# Patient Record
Sex: FEMALE | Race: WHITE | NOT HISPANIC OR LATINO | Employment: FULL TIME | URBAN - METROPOLITAN AREA
[De-identification: names, ages, dates, MRNs, and addresses within clinical notes are randomized per-mention and may not be internally consistent; named-entity substitution may affect disease eponyms.]

---

## 2017-02-06 PROBLEM — G89.29 CHRONIC BILATERAL LOW BACK PAIN WITHOUT SCIATICA: Status: ACTIVE | Noted: 2017-02-06

## 2018-08-28 PROBLEM — Q78.0 OSTEOGENESIS IMPERFECTA TYPE I: Chronic | Status: ACTIVE | Noted: 2017-02-05

## 2018-08-29 PROBLEM — S32.10XA FX SACRUM/COCCYX-CLOSED: Status: ACTIVE | Noted: 2018-08-28

## 2019-12-21 PROBLEM — Z34.90 NORMAL INTRAUTERINE PREGNANCY, ANTEPARTUM: Status: ACTIVE | Noted: 2019-12-21

## 2019-12-21 PROBLEM — O34.219 HISTORY OF CESAREAN DELIVERY, CURRENTLY PREGNANT: Status: ACTIVE | Noted: 2019-12-21

## 2020-03-17 PROBLEM — R93.1 ABNORMAL ECHOCARDIOGRAM: Status: ACTIVE | Noted: 2020-03-17

## 2021-06-03 PROBLEM — M54.50 CHRONIC BILATERAL LOW BACK PAIN WITHOUT SCIATICA: Chronic | Status: RESOLVED | Noted: 2017-02-06 | Resolved: 2021-06-03

## 2022-07-25 PROBLEM — N93.9 ABNORMAL UTERINE BLEEDING: Status: ACTIVE | Noted: 2022-07-25

## 2024-01-04 ENCOUNTER — OFFICE VISIT (OUTPATIENT)
Dept: URGENT CARE | Facility: CLINIC | Age: 39
End: 2024-01-04
Payer: COMMERCIAL

## 2024-01-04 VITALS
RESPIRATION RATE: 18 BRPM | DIASTOLIC BLOOD PRESSURE: 56 MMHG | TEMPERATURE: 98 F | OXYGEN SATURATION: 99 % | WEIGHT: 134 LBS | HEIGHT: 65 IN | SYSTOLIC BLOOD PRESSURE: 96 MMHG | BODY MASS INDEX: 22.33 KG/M2 | HEART RATE: 98 BPM

## 2024-01-04 DIAGNOSIS — R35.0 URINARY FREQUENCY: Primary | ICD-10-CM

## 2024-01-04 DIAGNOSIS — R30.0 DYSURIA: ICD-10-CM

## 2024-01-04 DIAGNOSIS — H60.501 ACUTE OTITIS EXTERNA OF RIGHT EAR, UNSPECIFIED TYPE: ICD-10-CM

## 2024-01-04 DIAGNOSIS — H66.001 NON-RECURRENT ACUTE SUPPURATIVE OTITIS MEDIA OF RIGHT EAR WITHOUT SPONTANEOUS RUPTURE OF TYMPANIC MEMBRANE: ICD-10-CM

## 2024-01-04 DIAGNOSIS — N30.01 ACUTE CYSTITIS WITH HEMATURIA: ICD-10-CM

## 2024-01-04 LAB
BILIRUB UR QL STRIP: POSITIVE
GLUCOSE UR QL STRIP: POSITIVE
KETONES UR QL STRIP: NEGATIVE
LEUKOCYTE ESTERASE UR QL STRIP: NEGATIVE
PH, POC UA: 5.5
POC BLOOD, URINE: NEGATIVE
POC NITRATES, URINE: POSITIVE
PROT UR QL STRIP: POSITIVE
SP GR UR STRIP: 1.02 (ref 1–1.03)
UROBILINOGEN UR STRIP-ACNC: 8 (ref 0.1–1.1)

## 2024-01-04 PROCEDURE — 81003 URINALYSIS AUTO W/O SCOPE: CPT | Mod: QW,S$GLB,, | Performed by: PHYSICIAN ASSISTANT

## 2024-01-04 PROCEDURE — 99214 OFFICE O/P EST MOD 30 MIN: CPT | Mod: S$GLB,,, | Performed by: PHYSICIAN ASSISTANT

## 2024-01-04 PROCEDURE — 87086 URINE CULTURE/COLONY COUNT: CPT | Performed by: PHYSICIAN ASSISTANT

## 2024-01-04 RX ORDER — CIPROFLOXACIN AND DEXAMETHASONE 3; 1 MG/ML; MG/ML
4 SUSPENSION/ DROPS AURICULAR (OTIC) 2 TIMES DAILY
Qty: 7.5 ML | Refills: 0 | Status: SHIPPED | OUTPATIENT
Start: 2024-01-04

## 2024-01-04 RX ORDER — AMOXICILLIN AND CLAVULANATE POTASSIUM 875; 125 MG/1; MG/1
1 TABLET, FILM COATED ORAL 2 TIMES DAILY
Qty: 20 TABLET | Refills: 0 | Status: SHIPPED | OUTPATIENT
Start: 2024-01-04 | End: 2024-01-14

## 2024-01-04 NOTE — LETTER
January 4, 2024      [facility]  [address], [address]  [place] [address]  Phone: [phone]       Patient: Asya Lora   YOB: 1985  Date of Visit: 01/04/2024    To Whom It May Concern:    Frances Lora  was at [facility] on 01/04/2024. The patient may return to work/school on 1/5/2024 with no restrictions. If you have any questions or concerns, or if I can be of further assistance, please do not hesitate to contact me.    Sincerely,      Jose Luis Watt PA-C

## 2024-01-04 NOTE — PROGRESS NOTES
"Subjective:      Patient ID: Asya Lora is a 38 y.o. female.    Vitals:  height is 5' 5" (1.651 m) and weight is 60.8 kg (134 lb). Her oral temperature is 98 °F (36.7 °C). Her blood pressure is 96/56 (abnormal) and her pulse is 98. Her respiration is 18 and oxygen saturation is 99%.     Chief Complaint: Urinary Frequency    Pt states she had Pyelonephritis, finished cipro 2 days ago and still having symptoms. She is experiencing dysuria, suprapubic pressure and urinary frequency.  States flank pain and significant hematuria has improved.  Pt also c/o right ear pain with drainage x 1 day ago, pt also c/o sinus congestion x 5 days.  No fevers, chills, or N/V.   Currently taking Azos for UTI symptoms.    Urinary Frequency   This is a recurrent problem. The current episode started in the past 7 days. The problem has been gradually worsening. The quality of the pain is described as burning and aching. There has been no fever. Associated symptoms include frequency and urgency. Pertinent negatives include no behavior changes, chills, discharge, flank pain, hematuria, hesitancy, nausea, sweats, vomiting, weight loss, bubble bath use, constipation, rash or withholding. She has tried antibiotics for the symptoms. The treatment provided mild relief. Her past medical history is significant for recurrent UTIs. There is no history of catheterization, diabetes insipidus, diabetes mellitus, genitourinary reflux, hypertension, kidney stones, a single kidney, STD, urinary stasis or a urological procedure.       Constitution: Negative for chills.   HENT:  Positive for ear pain, ear discharge, sinus pain and sinus pressure.    Gastrointestinal:  Negative for nausea, vomiting and constipation.   Genitourinary:  Positive for dysuria, frequency and urgency. Negative for flank pain and hematuria.   Skin:  Negative for rash.      Objective:     Physical Exam   Constitutional: She is oriented to person, place, and time. She appears " well-developed.   HENT:   Head: Normocephalic and atraumatic.   Ears:   Right Ear: External ear normal. There is drainage, swelling and tenderness. Tympanic membrane is bulging.   Left Ear: External ear normal.   Nose: Nose normal.   Mouth/Throat: Oropharynx is clear and moist. Mucous membranes are moist.   Eyes: Conjunctivae and EOM are normal. Pupils are equal, round, and reactive to light.   Neck: Neck supple.   Cardiovascular: Normal rate, regular rhythm, normal heart sounds and normal pulses.   Pulmonary/Chest: Effort normal and breath sounds normal.   Abdominal: She exhibits no distension. Soft. flat abdomen There is no left CVA tenderness and no right CVA tenderness.   Musculoskeletal: Normal range of motion.         General: Normal range of motion.   Neurological: She is alert and oriented to person, place, and time.   Skin: Skin is warm and dry.   Vitals reviewed.      Assessment:     1. Urinary frequency    2. Dysuria    3. Non-recurrent acute suppurative otitis media of right ear without spontaneous rupture of tympanic membrane    4. Acute otitis externa of right ear, unspecified type    5. Acute cystitis with hematuria        Plan:       Urinary frequency  -     Cancel: Urinalysis, Reflex to Urine Culture Urine, Clean Catch  -     Urine culture  -     POCT Urinalysis, Dipstick, Automated, W/O Scope    Dysuria  -     Urine culture  -     POCT Urinalysis, Dipstick, Automated, W/O Scope    Non-recurrent acute suppurative otitis media of right ear without spontaneous rupture of tympanic membrane  -     amoxicillin-clavulanate 875-125mg (AUGMENTIN) 875-125 mg per tablet; Take 1 tablet by mouth 2 (two) times daily. for 10 days  Dispense: 20 tablet; Refill: 0    Acute otitis externa of right ear, unspecified type  -     ciprofloxacin-dexAMETHasone 0.3-0.1% (CIPRODEX) 0.3-0.1 % DrpS; Place 4 drops into both ears 2 (two) times daily.  Dispense: 7.5 mL; Refill: 0    Acute cystitis with hematuria      Results for  orders placed or performed in visit on 01/04/24   POCT Urinalysis, Dipstick, Automated, W/O Scope   Result Value Ref Range    POC Blood, Urine Negative Negative, Positive Slide, Positive Tube    POC Bilirubin, Urine Positive (A) Negative, Positive Slide, Positive Tube    POC Urobilinogen, Urine 8.0 (A) 0.1 - 1.1    POC Ketones, Urine Negative Negative, Positive Slide, Positive Tube    POC Protein, Urine Positive (A) Negative, Positive Slide, Positive Tube    POC Nitrates, Urine Positive (A) Negative, Positive Slide, Positive Tube    POC Glucose, Urine Positive (A) Negative, Positive Slide, Positive Tube    pH, UA 5.5     POC Specific Gravity, Urine 1.025 1.003 - 1.029    POC Leukocytes, Urine Negative Negative, Positive Slide, Positive Tube              1. Medications:  Augmentin to cover ear infection and UTI.  We will call her with results of urine culture once available.  2. Maintain adequate hydration  3. Follow up with Primary Care physician in 3-5 days.  4.  Report to Emergency Department if symptoms worsen or change.

## 2024-01-05 ENCOUNTER — PATIENT MESSAGE (OUTPATIENT)
Dept: INTERNAL MEDICINE | Facility: CLINIC | Age: 39
End: 2024-01-05
Payer: COMMERCIAL

## 2024-01-05 LAB — BACTERIA UR CULT: NORMAL

## 2024-01-08 ENCOUNTER — OFFICE VISIT (OUTPATIENT)
Dept: INTERNAL MEDICINE | Facility: CLINIC | Age: 39
End: 2024-01-08
Payer: COMMERCIAL

## 2024-01-08 VITALS
BODY MASS INDEX: 22.19 KG/M2 | HEART RATE: 97 BPM | DIASTOLIC BLOOD PRESSURE: 60 MMHG | SYSTOLIC BLOOD PRESSURE: 102 MMHG | WEIGHT: 133.19 LBS | OXYGEN SATURATION: 97 % | HEIGHT: 65 IN | TEMPERATURE: 98 F

## 2024-01-08 DIAGNOSIS — F33.1 MODERATE EPISODE OF RECURRENT MAJOR DEPRESSIVE DISORDER: ICD-10-CM

## 2024-01-08 DIAGNOSIS — H69.91 DYSFUNCTION OF RIGHT EUSTACHIAN TUBE: Primary | ICD-10-CM

## 2024-01-08 DIAGNOSIS — N39.0 URINARY TRACT INFECTION WITHOUT HEMATURIA, SITE UNSPECIFIED: ICD-10-CM

## 2024-01-08 LAB
BILIRUB SERPL-MCNC: NORMAL MG/DL
BLOOD URINE, POC: NORMAL
CLARITY, POC UA: CLEAR
COLOR, POC UA: YELLOW
GLUCOSE UR QL STRIP: NORMAL
KETONES UR QL STRIP: NORMAL
LEUKOCYTE ESTERASE URINE, POC: NORMAL
NITRITE, POC UA: NORMAL
PH, POC UA: 5
PROTEIN, POC: NORMAL
SPECIFIC GRAVITY, POC UA: 1
UROBILINOGEN, POC UA: 0.2

## 2024-01-08 PROCEDURE — 99999 PR PBB SHADOW E&M-EST. PATIENT-LVL IV: CPT | Mod: PBBFAC,,, | Performed by: NURSE PRACTITIONER

## 2024-01-08 PROCEDURE — 81002 URINALYSIS NONAUTO W/O SCOPE: CPT | Mod: S$GLB,,, | Performed by: NURSE PRACTITIONER

## 2024-01-08 PROCEDURE — 3074F SYST BP LT 130 MM HG: CPT | Mod: CPTII,S$GLB,, | Performed by: NURSE PRACTITIONER

## 2024-01-08 PROCEDURE — 3008F BODY MASS INDEX DOCD: CPT | Mod: CPTII,S$GLB,, | Performed by: NURSE PRACTITIONER

## 2024-01-08 PROCEDURE — 1159F MED LIST DOCD IN RCRD: CPT | Mod: CPTII,S$GLB,, | Performed by: NURSE PRACTITIONER

## 2024-01-08 PROCEDURE — 1160F RVW MEDS BY RX/DR IN RCRD: CPT | Mod: CPTII,S$GLB,, | Performed by: NURSE PRACTITIONER

## 2024-01-08 PROCEDURE — 3078F DIAST BP <80 MM HG: CPT | Mod: CPTII,S$GLB,, | Performed by: NURSE PRACTITIONER

## 2024-01-08 PROCEDURE — 99214 OFFICE O/P EST MOD 30 MIN: CPT | Mod: S$GLB,,, | Performed by: NURSE PRACTITIONER

## 2024-01-08 RX ORDER — SERTRALINE HYDROCHLORIDE 50 MG/1
50 TABLET, FILM COATED ORAL
COMMUNITY
Start: 2023-12-04 | End: 2024-01-08 | Stop reason: SINTOL

## 2024-01-08 RX ORDER — FLUTICASONE PROPIONATE 50 MCG
2 SPRAY, SUSPENSION (ML) NASAL DAILY
Qty: 18.2 ML | Refills: 0 | Status: SHIPPED | OUTPATIENT
Start: 2024-01-08 | End: 2024-01-18

## 2024-01-08 NOTE — PROGRESS NOTES
"Subjective:       Patient ID: Asya Lora is a 38 y.o. female.    Chief Complaint: Recurrent Otitis and Urinary Tract Infection    Pt presents to clinic today for UC follow up  PCP DR. Doll  She reports about 2 weeks ago was dx with UTI  Went to UC and was put on Macrobid   Got worse- went to ER, dx with pyelonephritis - took cipro   Was still having symptoms last week as well as an ear infection so she went back to UC  Was changed to augmentin for the ear/urine   She does feel the UTI has improved  Would like her ear checked    Of note, she was recently started on celexa for her mood  Feels the celexa is helping  Tried zoloft but she had lots of side effects         /60   Pulse 97   Temp 97.6 °F (36.4 °C) (Tympanic)   Ht 5' 5" (1.651 m)   Wt 60.4 kg (133 lb 2.5 oz)   LMP 11/24/2022   SpO2 97%   BMI 22.16 kg/m²     Review of Systems   Constitutional:  Negative for activity change, appetite change, chills, diaphoresis, fatigue, fever and unexpected weight change.   HENT:  Positive for ear pain.    Respiratory:  Negative for cough and shortness of breath.    Cardiovascular:  Negative for chest pain, palpitations and leg swelling.   Gastrointestinal: Negative.    Genitourinary:  Positive for dysuria.   Musculoskeletal: Negative.    Skin:  Negative for color change, pallor, rash and wound.   Allergic/Immunologic: Negative for immunocompromised state.   Neurological: Negative.  Negative for dizziness and facial asymmetry.   Hematological:  Negative for adenopathy. Does not bruise/bleed easily.   Psychiatric/Behavioral:  Negative for agitation, behavioral problems and confusion.        Objective:      Physical Exam  Vitals and nursing note reviewed.   Constitutional:       General: She is not in acute distress.     Appearance: Normal appearance. She is well-developed. She is not diaphoretic.   HENT:      Head: Normocephalic and atraumatic.      Right Ear: External ear normal.      Left Ear: External " ear normal.      Nose: Nose normal.   Eyes:      General:         Right eye: No discharge.         Left eye: No discharge.      Conjunctiva/sclera: Conjunctivae normal.   Cardiovascular:      Rate and Rhythm: Normal rate and regular rhythm.      Heart sounds: Normal heart sounds. No murmur heard.  Pulmonary:      Effort: Pulmonary effort is normal. No respiratory distress.      Breath sounds: Normal breath sounds. No wheezing or rales.   Chest:      Chest wall: No tenderness.   Abdominal:      General: There is no distension.      Palpations: Abdomen is soft.      Tenderness: There is no abdominal tenderness. There is no right CVA tenderness or left CVA tenderness.      Hernia: No hernia is present.   Musculoskeletal:         General: No tenderness. Normal range of motion.      Cervical back: Normal range of motion.   Skin:     General: Skin is warm and dry.      Findings: No erythema or rash.   Neurological:      Mental Status: She is alert and oriented to person, place, and time.   Psychiatric:         Behavior: Behavior normal.         Thought Content: Thought content normal.         Judgment: Judgment normal.         Assessment:       1. Dysfunction of right eustachian tube    2. Urinary tract infection without hematuria, site unspecified    3. Moderate episode of recurrent major depressive disorder    4. BMI 22.0-22.9, adult        Plan:       Asya was seen today for recurrent otitis and urinary tract infection.    Diagnoses and all orders for this visit:    Dysfunction of right eustachian tube  -     fluticasone propionate (FLONASE) 50 mcg/actuation nasal spray; 2 sprays (100 mcg total) by Each Nostril route once daily. for 10 days    Urinary tract infection without hematuria, site unspecified  -     POCT URINE DIPSTICK WITHOUT MICROSCOPE    Moderate episode of recurrent major depressive disorder    BMI 22.0-22.9, adult      UA clear in office today, culture from UC also negative  Finish Augmentin as  prescribed  Add Flonase and daily zyrtec to help with fluid in ears  Follow up with Dr. Doll as scheduled and PRN

## 2024-01-16 ENCOUNTER — PATIENT MESSAGE (OUTPATIENT)
Dept: INTERNAL MEDICINE | Facility: CLINIC | Age: 39
End: 2024-01-16
Payer: COMMERCIAL

## 2024-01-16 DIAGNOSIS — B99.9 RECURRENT INFECTIONS: Primary | ICD-10-CM

## 2024-01-18 ENCOUNTER — LAB VISIT (OUTPATIENT)
Dept: LAB | Facility: HOSPITAL | Age: 39
End: 2024-01-18
Attending: FAMILY MEDICINE
Payer: COMMERCIAL

## 2024-01-18 DIAGNOSIS — B99.9 RECURRENT INFECTIONS: ICD-10-CM

## 2024-01-18 LAB
ALBUMIN SERPL BCP-MCNC: 4 G/DL (ref 3.5–5.2)
ALP SERPL-CCNC: 45 U/L (ref 55–135)
ALT SERPL W/O P-5'-P-CCNC: 17 U/L (ref 10–44)
ANION GAP SERPL CALC-SCNC: 7 MMOL/L (ref 8–16)
AST SERPL-CCNC: 17 U/L (ref 10–40)
BASOPHILS # BLD AUTO: 0.02 K/UL (ref 0–0.2)
BASOPHILS NFR BLD: 0.5 % (ref 0–1.9)
BILIRUB SERPL-MCNC: 0.6 MG/DL (ref 0.1–1)
BUN SERPL-MCNC: 14 MG/DL (ref 6–20)
CALCIUM SERPL-MCNC: 9.4 MG/DL (ref 8.7–10.5)
CHLORIDE SERPL-SCNC: 107 MMOL/L (ref 95–110)
CO2 SERPL-SCNC: 23 MMOL/L (ref 23–29)
CREAT SERPL-MCNC: 0.6 MG/DL (ref 0.5–1.4)
CRP SERPL-MCNC: 1 MG/L (ref 0–8.2)
DIFFERENTIAL METHOD BLD: ABNORMAL
EOSINOPHIL # BLD AUTO: 0 K/UL (ref 0–0.5)
EOSINOPHIL NFR BLD: 0.5 % (ref 0–8)
ERYTHROCYTE [DISTWIDTH] IN BLOOD BY AUTOMATED COUNT: 12.5 % (ref 11.5–14.5)
ERYTHROCYTE [SEDIMENTATION RATE] IN BLOOD BY PHOTOMETRIC METHOD: <2 MM/HR (ref 0–36)
EST. GFR  (NO RACE VARIABLE): >60 ML/MIN/1.73 M^2
GLUCOSE SERPL-MCNC: 75 MG/DL (ref 70–110)
HCT VFR BLD AUTO: 39.9 % (ref 37–48.5)
HGB BLD-MCNC: 12.8 G/DL (ref 12–16)
IMM GRANULOCYTES # BLD AUTO: 0.01 K/UL (ref 0–0.04)
IMM GRANULOCYTES NFR BLD AUTO: 0.3 % (ref 0–0.5)
LYMPHOCYTES # BLD AUTO: 1.9 K/UL (ref 1–4.8)
LYMPHOCYTES NFR BLD: 51.1 % (ref 18–48)
MCH RBC QN AUTO: 29.4 PG (ref 27–31)
MCHC RBC AUTO-ENTMCNC: 32.1 G/DL (ref 32–36)
MCV RBC AUTO: 92 FL (ref 82–98)
MONOCYTES # BLD AUTO: 0.3 K/UL (ref 0.3–1)
MONOCYTES NFR BLD: 7 % (ref 4–15)
NEUTROPHILS # BLD AUTO: 1.5 K/UL (ref 1.8–7.7)
NEUTROPHILS NFR BLD: 40.6 % (ref 38–73)
NRBC BLD-RTO: 0 /100 WBC
PLATELET # BLD AUTO: 204 K/UL (ref 150–450)
PMV BLD AUTO: 11.4 FL (ref 9.2–12.9)
POTASSIUM SERPL-SCNC: 4.8 MMOL/L (ref 3.5–5.1)
PROT SERPL-MCNC: 7.3 G/DL (ref 6–8.4)
RBC # BLD AUTO: 4.35 M/UL (ref 4–5.4)
SODIUM SERPL-SCNC: 137 MMOL/L (ref 136–145)
T4 FREE SERPL-MCNC: 0.87 NG/DL (ref 0.71–1.51)
THYROPEROXIDASE IGG SERPL-ACNC: <6 IU/ML
TSH SERPL DL<=0.005 MIU/L-ACNC: 0.47 UIU/ML (ref 0.4–4)
WBC # BLD AUTO: 3.72 K/UL (ref 3.9–12.7)

## 2024-01-18 PROCEDURE — 84443 ASSAY THYROID STIM HORMONE: CPT | Performed by: FAMILY MEDICINE

## 2024-01-18 PROCEDURE — 84439 ASSAY OF FREE THYROXINE: CPT | Performed by: FAMILY MEDICINE

## 2024-01-18 PROCEDURE — 86038 ANTINUCLEAR ANTIBODIES: CPT | Performed by: FAMILY MEDICINE

## 2024-01-18 PROCEDURE — 85652 RBC SED RATE AUTOMATED: CPT | Performed by: FAMILY MEDICINE

## 2024-01-18 PROCEDURE — 86140 C-REACTIVE PROTEIN: CPT | Performed by: FAMILY MEDICINE

## 2024-01-18 PROCEDURE — 86376 MICROSOMAL ANTIBODY EACH: CPT | Performed by: FAMILY MEDICINE

## 2024-01-18 PROCEDURE — 85025 COMPLETE CBC W/AUTO DIFF WBC: CPT | Performed by: FAMILY MEDICINE

## 2024-01-18 PROCEDURE — 36415 COLL VENOUS BLD VENIPUNCTURE: CPT | Mod: PO | Performed by: FAMILY MEDICINE

## 2024-01-18 PROCEDURE — 80053 COMPREHEN METABOLIC PANEL: CPT | Performed by: FAMILY MEDICINE

## 2024-01-19 LAB — ANA SER QL IF: NORMAL

## 2024-01-29 ENCOUNTER — OFFICE VISIT (OUTPATIENT)
Dept: UROLOGY | Facility: CLINIC | Age: 39
End: 2024-01-29
Payer: COMMERCIAL

## 2024-01-29 VITALS
WEIGHT: 139.31 LBS | HEART RATE: 74 BPM | DIASTOLIC BLOOD PRESSURE: 62 MMHG | BODY MASS INDEX: 23.19 KG/M2 | RESPIRATION RATE: 16 BRPM | SYSTOLIC BLOOD PRESSURE: 100 MMHG

## 2024-01-29 DIAGNOSIS — R39.89 SENSATION OF PRESSURE IN BLADDER AREA: Primary | ICD-10-CM

## 2024-01-29 LAB
BILIRUB UR QL STRIP: NEGATIVE
CLARITY UR REFRACT.AUTO: CLEAR
COLOR UR AUTO: YELLOW
GLUCOSE UR QL STRIP: NEGATIVE
HGB UR QL STRIP: NEGATIVE
KETONES UR QL STRIP: NEGATIVE
LEUKOCYTE ESTERASE UR QL STRIP: NEGATIVE
NITRITE UR QL STRIP: NEGATIVE
PH UR STRIP: 6 [PH] (ref 5–8)
PROT UR QL STRIP: NEGATIVE
SP GR UR STRIP: 1.02 (ref 1–1.03)
URN SPEC COLLECT METH UR: NORMAL

## 2024-01-29 PROCEDURE — 3074F SYST BP LT 130 MM HG: CPT | Mod: CPTII,S$GLB,, | Performed by: NURSE PRACTITIONER

## 2024-01-29 PROCEDURE — 87086 URINE CULTURE/COLONY COUNT: CPT | Performed by: NURSE PRACTITIONER

## 2024-01-29 PROCEDURE — 99999 PR PBB SHADOW E&M-EST. PATIENT-LVL IV: CPT | Mod: PBBFAC,,, | Performed by: NURSE PRACTITIONER

## 2024-01-29 PROCEDURE — 99204 OFFICE O/P NEW MOD 45 MIN: CPT | Mod: S$GLB,,, | Performed by: NURSE PRACTITIONER

## 2024-01-29 PROCEDURE — 81003 URINALYSIS AUTO W/O SCOPE: CPT | Performed by: NURSE PRACTITIONER

## 2024-01-29 PROCEDURE — 3078F DIAST BP <80 MM HG: CPT | Mod: CPTII,S$GLB,, | Performed by: NURSE PRACTITIONER

## 2024-01-29 PROCEDURE — 3008F BODY MASS INDEX DOCD: CPT | Mod: CPTII,S$GLB,, | Performed by: NURSE PRACTITIONER

## 2024-01-29 PROCEDURE — 1159F MED LIST DOCD IN RCRD: CPT | Mod: CPTII,S$GLB,, | Performed by: NURSE PRACTITIONER

## 2024-01-29 NOTE — PROGRESS NOTES
Chief Complaint:   Pelvic pain with voiding    HPI:   Patient is a 38-year-old female that is presenting with an increase in pelvic pain and pressure with voiding after hysterectomy.  Patient states that she has been seen with same issues, concerned she has a urinary tract infection, urine culture is negative.  Urine in clinic indicates small blood, all other parameters are negative.  PVR is 12 mL.  Is concerned she has a cystocele.  States that she has had chronic constipation and occasional pain with intercourse.    Allergies:  Patient has no known allergies.    Medications:  has a current medication list which includes the following prescription(s): acetaminophen, aspirin, betahistine (bulk), ciprofloxacin-dexamethasone 0.3-0.1%, citalopram, jublia, hydrocortisone-pramoxine, lisdexamfetamine, metoprolol tartrate, minoxidil, prochlorperazine, propranolol, and rizatriptan.    Review of Systems:  General: No fever, chills, fatigability, or weight loss.  Skin: No rashes, itching, or changes in color or texture of skin.  Chest: Denies MATHIS, cyanosis, wheezing, cough, and sputum production.  Abdomen: Appetite fine. No weight loss. Denies diarrhea, abdominal pain, hematemesis, or blood in stool.+ constipation.   Musculoskeletal: No joint stiffness or swelling. Denies back pain.  : As above.  All other review of systems negative.    PMH:   has a past medical history of Abnormality of hormone, Amenorrhea, Anemia, Constipation, Headache, Osteogenesis imperfecta, PONV (postoperative nausea and vomiting), and Scoliosis.    PSH:   has a past surgical history that includes  section; Hernia repair; Back surgery (); Fracture surgery (Right, 2011); Cervical biopsy w/ loop electrode excision; Pre-malignant / benign skin lesion excision (Left); Diagnostic laparoscopy (N/A, 2021); Robot-assisted laparoscopic abdominal hysterectomy using da Garland Xi (N/A, 2022); Cystoscopy (N/A, 2022); Robot-assisted  laparoscopic excision of cyst of ovary (Right, 12/14/2022); and Hysterectomy.    FamHx: family history includes Alzheimer's disease in her maternal grandmother; Blindness in her maternal grandfather; Cataracts in her maternal grandfather; Hypertension in her father and sister; Osteogenesis imperfecta in her brother and mother; Stroke in her father.    SocHx:  reports that she has quit smoking. Her smoking use included cigarettes. She has never used smokeless tobacco. She reports current alcohol use. She reports that she does not use drugs.      Physical Exam:  Vitals:    01/29/24 1022   BP: 100/62   Pulse: 74   Resp: 16     General: A&Ox3, no apparent distress, no deformities  Neck: No masses, normal thyroid  Lungs: normal inspiration, no use of accessory muscles  Heart: normal pulse, no arrhythmias  Abdomen: Soft, NT, ND, no masses, no hernias, no hepatosplenomegaly  Lymphatic: Neck and groin nodes negative  :  Normal external genitalia, female.  No cystocele or rectocele with Valsalva maneuver.  Labs/Studies:   See HPI    Impression/Plan:   Pelvic floor dysfunction  Patient was educated on behavior modifications needed to decrease symptoms.  Urinalysis and culture were sent to lab.  Patient denies overactive bladder symptoms and no documentation that would support recurrent urinary tract infections.  Will be referred for pelvic floor training and patient to return for re-evaluation in 6-8 weeks.

## 2024-01-30 LAB — BACTERIA UR CULT: NO GROWTH

## 2024-06-02 PROBLEM — R07.9 CHEST PAIN: Status: ACTIVE | Noted: 2024-06-02

## 2024-06-03 PROBLEM — R07.9 CHEST PAIN: Status: RESOLVED | Noted: 2024-06-02 | Resolved: 2024-06-03

## 2024-07-10 ENCOUNTER — OFFICE VISIT (OUTPATIENT)
Dept: SURGERY | Facility: CLINIC | Age: 39
End: 2024-07-10
Payer: COMMERCIAL

## 2024-07-10 VITALS
OXYGEN SATURATION: 98 % | SYSTOLIC BLOOD PRESSURE: 100 MMHG | WEIGHT: 137 LBS | HEART RATE: 103 BPM | DIASTOLIC BLOOD PRESSURE: 64 MMHG | HEIGHT: 65 IN | BODY MASS INDEX: 22.82 KG/M2 | TEMPERATURE: 98 F

## 2024-07-10 DIAGNOSIS — K59.00 CONSTIPATION, UNSPECIFIED CONSTIPATION TYPE: ICD-10-CM

## 2024-07-10 DIAGNOSIS — K60.2 ANAL FISSURE: Primary | ICD-10-CM

## 2024-07-10 PROCEDURE — 3008F BODY MASS INDEX DOCD: CPT | Mod: CPTII,S$GLB,, | Performed by: STUDENT IN AN ORGANIZED HEALTH CARE EDUCATION/TRAINING PROGRAM

## 2024-07-10 PROCEDURE — 99999 PR PBB SHADOW E&M-EST. PATIENT-LVL III: CPT | Mod: PBBFAC,,, | Performed by: STUDENT IN AN ORGANIZED HEALTH CARE EDUCATION/TRAINING PROGRAM

## 2024-07-10 PROCEDURE — 1159F MED LIST DOCD IN RCRD: CPT | Mod: CPTII,S$GLB,, | Performed by: STUDENT IN AN ORGANIZED HEALTH CARE EDUCATION/TRAINING PROGRAM

## 2024-07-10 PROCEDURE — 99204 OFFICE O/P NEW MOD 45 MIN: CPT | Mod: S$GLB,,, | Performed by: STUDENT IN AN ORGANIZED HEALTH CARE EDUCATION/TRAINING PROGRAM

## 2024-07-10 PROCEDURE — 3074F SYST BP LT 130 MM HG: CPT | Mod: CPTII,S$GLB,, | Performed by: STUDENT IN AN ORGANIZED HEALTH CARE EDUCATION/TRAINING PROGRAM

## 2024-07-10 PROCEDURE — 3078F DIAST BP <80 MM HG: CPT | Mod: CPTII,S$GLB,, | Performed by: STUDENT IN AN ORGANIZED HEALTH CARE EDUCATION/TRAINING PROGRAM

## 2024-07-10 NOTE — PROGRESS NOTES
CRS Office Visit    SUBJECTIVE:     Chief Complaint: anal pain and bleeding    History of Present Illness:  Patient is a 38 y.o. female presents with complaints of severe anal pain with recent bleeding.  Reports she has had pain for a proximally 1 month.  Pain was sudden in onset after she passed a particularly hard and constipated stool.  She endorses severe constipation.  She has tried multiple over-the-counter and prescription medications with no improvement.  Reports hard bowel movements every 3-4 days.  Does endorse straining with bowel movements as well as a prolonged amount of time on the toilet.  She drinks adequate water intake.  She does not use fiber supplementation    Colonoscopy:  None prior    Pathology:  None    Review of patient's allergies indicates:  No Known Allergies    Past Medical History:   Diagnosis Date    Abnormality of hormone     Amenorrhea     Anemia     Constipation     Headache     Osteogenesis imperfecta     PONV (postoperative nausea and vomiting)     Scoliosis      Past Surgical History:   Procedure Laterality Date    BACK SURGERY  2012    initial Graff rods , then revision    CERVICAL BIOPSY  W/ LOOP ELECTRODE EXCISION       SECTION      x1    CYSTOSCOPY N/A 2022    Procedure: CYSTOSCOPY;  Surgeon: Jackelin Reinoso MD;  Location: [facility] OR;  Service: OB/GYN;  Laterality: N/A;    DIAGNOSTIC LAPAROSCOPY N/A 2021    Procedure: LAPAROSCOPY, DIAGNOSTIC;  Surgeon: Jackelin Reinoso MD;  Location: [facility] OR;  Service: OB/GYN;  Laterality: N/A;  Evacuation of Hemoperitoneum    FRACTURE SURGERY Right     ankle surgery    HERNIA REPAIR      HYSTERECTOMY      PRE-MALIGNANT / BENIGN SKIN LESION EXCISION Left     abdomen    ROBOT-ASSISTED LAPAROSCOPIC ABDOMINAL HYSTERECTOMY USING DA LUKE XI N/A 2022    Procedure: XI ROBOTIC HYSTERECTOMY;  Surgeon: Jackelin Reinoso MD;  Location: [facility] OR;  Service: OB/GYN;  Laterality: N/A;    ROBOT-ASSISTED LAPAROSCOPIC EXCISION  OF CYST OF OVARY Right 12/14/2022    Procedure: ROBOTIC CYSTECTOMY, OVARIAN;  Surgeon: Jackelin Reinoso MD;  Location: [facility];  Service: OB/GYN;  Laterality: Right;     Family History   Problem Relation Name Age of Onset    Osteogenesis imperfecta Mother      Stroke Father      Hypertension Father      Hypertension Sister      Osteogenesis imperfecta Brother      Alzheimer's disease Maternal Grandmother      Blindness Maternal Grandfather      Cataracts Maternal Grandfather       Social History     Tobacco Use    Smoking status: Former     Types: Cigarettes    Smokeless tobacco: Never   Substance Use Topics    Alcohol use: Yes     Comment: occasional    Drug use: No          OBJECTIVE:     Vital Signs (Most Recent)  Temp: 98.3 °F (36.8 °C) (07/10/24 1610)  Pulse: 103 (07/10/24 1610)  BP: 100/64 (07/10/24 1610)  SpO2: 98 % (07/10/24 1610)    Physical Exam:  Physical Exam  Constitutional:       Appearance: She is well-developed.   HENT:      Head: Normocephalic and atraumatic.   Eyes:      Conjunctiva/sclera: Conjunctivae normal.      Pupils: Pupils are equal, round, and reactive to light.   Neck:      Thyroid: No thyromegaly.   Cardiovascular:      Rate and Rhythm: Normal rate and regular rhythm.   Pulmonary:      Effort: Pulmonary effort is normal. No respiratory distress.   Abdominal:      General: There is no distension.      Palpations: Abdomen is soft. There is no mass.      Tenderness: There is no abdominal tenderness.   Genitourinary:     Comments: External anal exam with posterior and right lateral anal fissure with recent stigmata of bleeding.  ALONSO and anoscopic exam deferred secondary to pain  Musculoskeletal:         General: No tenderness. Normal range of motion.      Cervical back: Normal range of motion.   Skin:     General: Skin is warm and dry.      Capillary Refill: Capillary refill takes less than 2 seconds.   Neurological:      General: No focal deficit present.      Mental Status: She is alert and  oriented to person, place, and time.           ASSESSMENT/PLAN:     Diagnoses and all orders for this visit:    Anal fissure    Constipation, unspecified constipation type    Other orders  -     diltiazem HCl (DILTIAZEM 2% - LIDOCAINE 5% CREAM); Apply 1 application  topically 3 (three) times daily.    - discussed etiology of anal fissure and its association with severe constipation.  - reviewed treatment options of anal fissure.  We will try topical diltiazem and lidocaine cream 1st.  Other options include Botox injection with chemo denervation or lateral internal sphincterotomy.  - need to address underlying constipation. Discussed that a minimum I would recommend behavioral, lifestyle and medication modifications to improve bowel habits. Usual bowel management handout given to patient. This includes a stool softener twice per day, fiber powder supplementation daily, drinking at least 64oz of water/day, avoiding straining with bowel movements, spending less than 5 min on toilet per bowel movement, eating a high fiber diet, using miralax as needed to achieve a bowel movement daily and using wet wipes to wipe after bowel movements when irritated.   - I encouraged her to be consistent with are recommended regimen.  - once anal fissure has healed will need colonoscopy and anorectal manometry to workup severe constipation.  I suspect slow transit constipation   - RTC 4-6 weeks or sooner if pain does not improve

## 2024-07-19 ENCOUNTER — PATIENT MESSAGE (OUTPATIENT)
Dept: SURGERY | Facility: CLINIC | Age: 39
End: 2024-07-19
Payer: COMMERCIAL

## 2024-09-25 ENCOUNTER — OFFICE VISIT (OUTPATIENT)
Dept: SURGERY | Facility: CLINIC | Age: 39
End: 2024-09-25
Payer: COMMERCIAL

## 2024-09-25 VITALS
TEMPERATURE: 99 F | SYSTOLIC BLOOD PRESSURE: 102 MMHG | DIASTOLIC BLOOD PRESSURE: 67 MMHG | WEIGHT: 140.75 LBS | OXYGEN SATURATION: 97 % | HEIGHT: 65 IN | BODY MASS INDEX: 23.45 KG/M2 | HEART RATE: 88 BPM

## 2024-09-25 DIAGNOSIS — K59.00 CONSTIPATION, UNSPECIFIED CONSTIPATION TYPE: Primary | ICD-10-CM

## 2024-09-25 PROCEDURE — 3008F BODY MASS INDEX DOCD: CPT | Mod: CPTII,S$GLB,, | Performed by: STUDENT IN AN ORGANIZED HEALTH CARE EDUCATION/TRAINING PROGRAM

## 2024-09-25 PROCEDURE — 99213 OFFICE O/P EST LOW 20 MIN: CPT | Mod: S$GLB,,, | Performed by: STUDENT IN AN ORGANIZED HEALTH CARE EDUCATION/TRAINING PROGRAM

## 2024-09-25 PROCEDURE — 3078F DIAST BP <80 MM HG: CPT | Mod: CPTII,S$GLB,, | Performed by: STUDENT IN AN ORGANIZED HEALTH CARE EDUCATION/TRAINING PROGRAM

## 2024-09-25 PROCEDURE — 1159F MED LIST DOCD IN RCRD: CPT | Mod: CPTII,S$GLB,, | Performed by: STUDENT IN AN ORGANIZED HEALTH CARE EDUCATION/TRAINING PROGRAM

## 2024-09-25 PROCEDURE — 3074F SYST BP LT 130 MM HG: CPT | Mod: CPTII,S$GLB,, | Performed by: STUDENT IN AN ORGANIZED HEALTH CARE EDUCATION/TRAINING PROGRAM

## 2024-09-25 PROCEDURE — 99999 PR PBB SHADOW E&M-EST. PATIENT-LVL III: CPT | Mod: PBBFAC,,, | Performed by: STUDENT IN AN ORGANIZED HEALTH CARE EDUCATION/TRAINING PROGRAM

## 2024-09-25 RX ORDER — POLYETHYLENE GLYCOL 3350, SODIUM SULFATE ANHYDROUS, SODIUM BICARBONATE, SODIUM CHLORIDE, POTASSIUM CHLORIDE 236; 22.74; 6.74; 5.86; 2.97 G/4L; G/4L; G/4L; G/4L; G/4L
4 POWDER, FOR SOLUTION ORAL DAILY
Qty: 8000 ML | Refills: 0 | Status: SHIPPED | OUTPATIENT
Start: 2024-09-25 | End: 2024-09-27

## 2024-09-25 NOTE — H&P (VIEW-ONLY)
CRS Office Visit    SUBJECTIVE:     Chief Complaint: fissure follow up    History of Present Illness:  Patient is a 39 y.o. female presents as a follow up after anal fissure.  She reports that the topical diltiazem/lidocaine cream improved her pain.  She no longer has bleeding.  Will occasionally have perianal discomfort with bowel movements.  Endorses continued constipation with only 1 bowel movement per week.  Denies straining or prolonged time on the toilet.  Has previously been on Linzess but was stopped during her pregnancy.    Colonoscopy:  None prior      Review of patient's allergies indicates:  No Known Allergies    Past Medical History:   Diagnosis Date    Abnormality of hormone     Amenorrhea     Anemia     Constipation     Headache     Osteogenesis imperfecta     PONV (postoperative nausea and vomiting)     Scoliosis      Past Surgical History:   Procedure Laterality Date    BACK SURGERY      initial Graff rods , then revision    CERVICAL BIOPSY  W/ LOOP ELECTRODE EXCISION       SECTION      x1    CYSTOSCOPY N/A 2022    Procedure: CYSTOSCOPY;  Surgeon: Jackelin Reinoso MD;  Location: [facility] OR;  Service: OB/GYN;  Laterality: N/A;    DIAGNOSTIC LAPAROSCOPY N/A 2021    Procedure: LAPAROSCOPY, DIAGNOSTIC;  Surgeon: Jackelin Reinoso MD;  Location: [facility] OR;  Service: OB/GYN;  Laterality: N/A;  Evacuation of Hemoperitoneum    FRACTURE SURGERY Right     ankle surgery    HERNIA REPAIR      HYSTERECTOMY      PRE-MALIGNANT / BENIGN SKIN LESION EXCISION Left     abdomen    ROBOT-ASSISTED LAPAROSCOPIC ABDOMINAL HYSTERECTOMY USING DA LUKE XI N/A 2022    Procedure: XI ROBOTIC HYSTERECTOMY;  Surgeon: Jackelin Reinoso MD;  Location: [facility] OR;  Service: OB/GYN;  Laterality: N/A;    ROBOT-ASSISTED LAPAROSCOPIC EXCISION OF CYST OF OVARY Right 2022    Procedure: ROBOTIC CYSTECTOMY, OVARIAN;  Surgeon: Jackelin Reinoso MD;  Location: [facility] OR;  Service: OB/GYN;  Laterality: Right;      Family History   Problem Relation Name Age of Onset    Osteogenesis imperfecta Mother      Stroke Father      Hypertension Father      Hypertension Sister      Osteogenesis imperfecta Brother      Alzheimer's disease Maternal Grandmother      Blindness Maternal Grandfather      Cataracts Maternal Grandfather       Social History     Tobacco Use    Smoking status: Former     Types: Cigarettes    Smokeless tobacco: Never   Substance Use Topics    Alcohol use: Yes     Comment: occasional    Drug use: No        OBJECTIVE:     Vital Signs (Most Recent)  Temp: 98.5 °F (36.9 °C) (09/25/24 1037)  Pulse: 88 (09/25/24 1037)  BP: 102/67 (09/25/24 1037)  SpO2: 97 % (09/25/24 1037)    Physical Exam:  Physical Exam  Constitutional:       Appearance: She is well-developed.   HENT:      Head: Normocephalic and atraumatic.   Eyes:      Conjunctiva/sclera: Conjunctivae normal.      Pupils: Pupils are equal, round, and reactive to light.   Neck:      Thyroid: No thyromegaly.   Cardiovascular:      Rate and Rhythm: Normal rate and regular rhythm.   Pulmonary:      Effort: Pulmonary effort is normal. No respiratory distress.   Abdominal:      General: There is no distension.      Palpations: Abdomen is soft. There is no mass.      Tenderness: There is no abdominal tenderness.   Musculoskeletal:         General: No tenderness. Normal range of motion.      Cervical back: Normal range of motion.   Skin:     General: Skin is warm and dry.      Capillary Refill: Capillary refill takes less than 2 seconds.   Neurological:      General: No focal deficit present.      Mental Status: She is alert and oriented to person, place, and time.           ASSESSMENT/PLAN:     Diagnoses and all orders for this visit:    Constipation, unspecified constipation type  -     Case Request Endoscopy: COLONOSCOPY    Other orders  -     polyethylene glycol (GOLYTELY) 236-22.74-6.74 -5.86 gram suspension; Take 4,000 mLs (4 L total) by mouth once daily. for 2  doses      - fissure healed with topical diltiazem and lidocaine cream but ongoing constipation remains a significant issue  - will plan for colonoscopy with 2 day prep using GoLYTELY  - colonoscopy 10/8  - will refer to Gastroenterology if no evidence of mechanical obstruction for reconsideration of Linzess or Amitiza

## 2024-09-25 NOTE — PROGRESS NOTES
CRS Office Visit    SUBJECTIVE:     Chief Complaint: fissure follow up    History of Present Illness:  Patient is a 39 y.o. female presents as a follow up after anal fissure.  She reports that the topical diltiazem/lidocaine cream improved her pain.  She no longer has bleeding.  Will occasionally have perianal discomfort with bowel movements.  Endorses continued constipation with only 1 bowel movement per week.  Denies straining or prolonged time on the toilet.  Has previously been on Linzess but was stopped during her pregnancy.    Colonoscopy:  None prior      Review of patient's allergies indicates:  No Known Allergies    Past Medical History:   Diagnosis Date    Abnormality of hormone     Amenorrhea     Anemia     Constipation     Headache     Osteogenesis imperfecta     PONV (postoperative nausea and vomiting)     Scoliosis      Past Surgical History:   Procedure Laterality Date    BACK SURGERY      initial Graff rods , then revision    CERVICAL BIOPSY  W/ LOOP ELECTRODE EXCISION       SECTION      x1    CYSTOSCOPY N/A 2022    Procedure: CYSTOSCOPY;  Surgeon: Jackelin Reinoso MD;  Location: [facility] OR;  Service: OB/GYN;  Laterality: N/A;    DIAGNOSTIC LAPAROSCOPY N/A 2021    Procedure: LAPAROSCOPY, DIAGNOSTIC;  Surgeon: Jackelin Reinoso MD;  Location: [facility] OR;  Service: OB/GYN;  Laterality: N/A;  Evacuation of Hemoperitoneum    FRACTURE SURGERY Right     ankle surgery    HERNIA REPAIR      HYSTERECTOMY      PRE-MALIGNANT / BENIGN SKIN LESION EXCISION Left     abdomen    ROBOT-ASSISTED LAPAROSCOPIC ABDOMINAL HYSTERECTOMY USING DA LUKE XI N/A 2022    Procedure: XI ROBOTIC HYSTERECTOMY;  Surgeon: Jackelin Reinoso MD;  Location: [facility] OR;  Service: OB/GYN;  Laterality: N/A;    ROBOT-ASSISTED LAPAROSCOPIC EXCISION OF CYST OF OVARY Right 2022    Procedure: ROBOTIC CYSTECTOMY, OVARIAN;  Surgeon: Jackelin Reinoso MD;  Location: [facility] OR;  Service: OB/GYN;  Laterality: Right;      Family History   Problem Relation Name Age of Onset    Osteogenesis imperfecta Mother      Stroke Father      Hypertension Father      Hypertension Sister      Osteogenesis imperfecta Brother      Alzheimer's disease Maternal Grandmother      Blindness Maternal Grandfather      Cataracts Maternal Grandfather       Social History     Tobacco Use    Smoking status: Former     Types: Cigarettes    Smokeless tobacco: Never   Substance Use Topics    Alcohol use: Yes     Comment: occasional    Drug use: No        OBJECTIVE:     Vital Signs (Most Recent)  Temp: 98.5 °F (36.9 °C) (09/25/24 1037)  Pulse: 88 (09/25/24 1037)  BP: 102/67 (09/25/24 1037)  SpO2: 97 % (09/25/24 1037)    Physical Exam:  Physical Exam  Constitutional:       Appearance: She is well-developed.   HENT:      Head: Normocephalic and atraumatic.   Eyes:      Conjunctiva/sclera: Conjunctivae normal.      Pupils: Pupils are equal, round, and reactive to light.   Neck:      Thyroid: No thyromegaly.   Cardiovascular:      Rate and Rhythm: Normal rate and regular rhythm.   Pulmonary:      Effort: Pulmonary effort is normal. No respiratory distress.   Abdominal:      General: There is no distension.      Palpations: Abdomen is soft. There is no mass.      Tenderness: There is no abdominal tenderness.   Musculoskeletal:         General: No tenderness. Normal range of motion.      Cervical back: Normal range of motion.   Skin:     General: Skin is warm and dry.      Capillary Refill: Capillary refill takes less than 2 seconds.   Neurological:      General: No focal deficit present.      Mental Status: She is alert and oriented to person, place, and time.           ASSESSMENT/PLAN:     Diagnoses and all orders for this visit:    Constipation, unspecified constipation type  -     Case Request Endoscopy: COLONOSCOPY    Other orders  -     polyethylene glycol (GOLYTELY) 236-22.74-6.74 -5.86 gram suspension; Take 4,000 mLs (4 L total) by mouth once daily. for 2  doses      - fissure healed with topical diltiazem and lidocaine cream but ongoing constipation remains a significant issue  - will plan for colonoscopy with 2 day prep using GoLYTELY  - colonoscopy 10/8  - will refer to Gastroenterology if no evidence of mechanical obstruction for reconsideration of Linzess or Amitiza

## 2024-10-02 ENCOUNTER — TELEPHONE (OUTPATIENT)
Dept: SURGERY | Facility: CLINIC | Age: 39
End: 2024-10-02
Payer: COMMERCIAL

## 2024-10-02 NOTE — TELEPHONE ENCOUNTER
Called pharmacy to clarify order: per PA, needs 1-4,000ml bottle. Pt is to drink half the day before cscope and the other half the morning of.     ----- Message from Martín Boo PA-C sent at 10/2/2024 10:28 AM CDT -----  Contact: [place]  Would yall be able to call and clarify with them? Its two day prep for colonoscopy. I think the only thing different is you are taking something additional with the golytely and fasting longer period of time. Shouldn't change the golytely dose or directions compared to normal  ----- Message -----  From: Feli Chua RN  Sent: 10/2/2024   9:21 AM CDT  To: Martín Boo PA-C      ----- Message -----  From: Britney Whipple  Sent: 10/2/2024   9:21 AM CDT  To: Candi Hamilton Staff    .Type:  Pharmacy Calling to Clarify an RX    Name of Caller:   Pharmacy Name: .[facility] - [facility] -   [address]  [place] [address]  Phone: [phone] Fax: [phone]      Prescription Name: Poyethylene Glycol   What do they need to clarify?: Needs clarification on directions   Best Call Back Number: [phone]  Additional Information:        Thanks

## 2024-10-08 ENCOUNTER — ANESTHESIA EVENT (OUTPATIENT)
Dept: ENDOSCOPY | Facility: HOSPITAL | Age: 39
End: 2024-10-08
Payer: COMMERCIAL

## 2024-10-08 ENCOUNTER — ANESTHESIA (OUTPATIENT)
Dept: ENDOSCOPY | Facility: HOSPITAL | Age: 39
End: 2024-10-08
Payer: COMMERCIAL

## 2024-10-08 ENCOUNTER — HOSPITAL ENCOUNTER (OUTPATIENT)
Facility: HOSPITAL | Age: 39
Discharge: HOME | End: 2024-10-08
Attending: STUDENT IN AN ORGANIZED HEALTH CARE EDUCATION/TRAINING PROGRAM | Admitting: STUDENT IN AN ORGANIZED HEALTH CARE EDUCATION/TRAINING PROGRAM
Payer: COMMERCIAL

## 2024-10-08 DIAGNOSIS — K59.00 CONSTIPATION, UNSPECIFIED CONSTIPATION TYPE: Primary | ICD-10-CM

## 2024-10-08 DIAGNOSIS — Z12.11 SCREENING FOR COLON CANCER: ICD-10-CM

## 2024-10-08 PROCEDURE — 37000009 HC ANESTHESIA EA ADD 15 MINS: Performed by: STUDENT IN AN ORGANIZED HEALTH CARE EDUCATION/TRAINING PROGRAM

## 2024-10-08 PROCEDURE — 37000008 HC ANESTHESIA 1ST 15 MINUTES: Performed by: STUDENT IN AN ORGANIZED HEALTH CARE EDUCATION/TRAINING PROGRAM

## 2024-10-08 PROCEDURE — 45378 DIAGNOSTIC COLONOSCOPY: CPT | Mod: ,,, | Performed by: STUDENT IN AN ORGANIZED HEALTH CARE EDUCATION/TRAINING PROGRAM

## 2024-10-08 PROCEDURE — 63600175 PHARM REV CODE 636 W HCPCS: Performed by: NURSE ANESTHETIST, CERTIFIED REGISTERED

## 2024-10-08 PROCEDURE — 45378 DIAGNOSTIC COLONOSCOPY: CPT | Performed by: STUDENT IN AN ORGANIZED HEALTH CARE EDUCATION/TRAINING PROGRAM

## 2024-10-08 RX ORDER — PROPOFOL 10 MG/ML
VIAL (ML) INTRAVENOUS
Status: DISCONTINUED | OUTPATIENT
Start: 2024-10-08 | End: 2024-10-08

## 2024-10-08 RX ORDER — SODIUM CHLORIDE, SODIUM LACTATE, POTASSIUM CHLORIDE, CALCIUM CHLORIDE 600; 310; 30; 20 MG/100ML; MG/100ML; MG/100ML; MG/100ML
INJECTION, SOLUTION INTRAVENOUS CONTINUOUS PRN
Status: DISCONTINUED | OUTPATIENT
Start: 2024-10-08 | End: 2024-10-08

## 2024-10-08 RX ORDER — LIDOCAINE HYDROCHLORIDE 10 MG/ML
INJECTION, SOLUTION EPIDURAL; INFILTRATION; INTRACAUDAL; PERINEURAL
Status: DISCONTINUED | OUTPATIENT
Start: 2024-10-08 | End: 2024-10-08

## 2024-10-08 RX ADMIN — PROPOFOL 40 MG: 10 INJECTION, EMULSION INTRAVENOUS at 09:10

## 2024-10-08 RX ADMIN — LIDOCAINE HYDROCHLORIDE 50 MG: 10 SOLUTION INTRAVENOUS at 09:10

## 2024-10-08 RX ADMIN — PROPOFOL 40 MG: 10 INJECTION, EMULSION INTRAVENOUS at 10:10

## 2024-10-08 RX ADMIN — SODIUM CHLORIDE, SODIUM LACTATE, POTASSIUM CHLORIDE, AND CALCIUM CHLORIDE: .6; .31; .03; .02 INJECTION, SOLUTION INTRAVENOUS at 09:10

## 2024-10-08 NOTE — ANESTHESIA PREPROCEDURE EVALUATION
10/08/2024  Asya LAGOS is a 39 y.o., female.    Patient Active Problem List   Diagnosis    Umbilical hernia    Osteogenesis imperfecta type I    Failure of spinal fusion    History of spinal fusion for scoliosis    Reactive depression    ADHD    Grade I hemorrhoids    Intractable back pain    Fx sacrum/coccyx-closed    Foot pain, right    Sprain of anterior talofibular ligament of right ankle    Lisfranc's sprain, right, initial encounter    Genital herpes simplex    Abnormal echocardiogram    Normal  (single liveborn)    Intractable chronic migraine without aura and without status migrainosus    Medication overuse headache    Migraine with aura and without status migrainosus, not intractable    PVC (premature ventricular contraction)    Decreased strength    Iron deficiency anemia due to chronic blood loss    Status post laparoscopic hysterectomy    Balance disorder    Mild aortic regurgitation    Meniere's disease    Moderate episode of recurrent major depressive disorder    Chest pain      Past Surgical History:   Procedure Laterality Date    BACK SURGERY      initial Graff rods , then revision    CERVICAL BIOPSY  W/ LOOP ELECTRODE EXCISION       SECTION      x1    CYSTOSCOPY N/A 2022    Procedure: CYSTOSCOPY;  Surgeon: Jackelin Reinoso MD;  Location: [facility] OR;  Service: OB/GYN;  Laterality: N/A;    DIAGNOSTIC LAPAROSCOPY N/A 2021    Procedure: LAPAROSCOPY, DIAGNOSTIC;  Surgeon: Jackelin Reinoso MD;  Location: [facility] OR;  Service: OB/GYN;  Laterality: N/A;  Evacuation of Hemoperitoneum    FRACTURE SURGERY Right     ankle surgery    HERNIA REPAIR      HYSTERECTOMY      PRE-MALIGNANT / BENIGN SKIN LESION EXCISION Left     abdomen    ROBOT-ASSISTED LAPAROSCOPIC ABDOMINAL HYSTERECTOMY USING DA LUKE XI N/A 2022    Procedure: XI ROBOTIC HYSTERECTOMY;   Surgeon: Jackelin Reinoso MD;  Location: [facility];  Service: OB/GYN;  Laterality: N/A;    ROBOT-ASSISTED LAPAROSCOPIC EXCISION OF CYST OF OVARY Right 12/14/2022    Procedure: ROBOTIC CYSTECTOMY, OVARIAN;  Surgeon: Jackelin Reinoso MD;  Location: [facility] OR;  Service: OB/GYN;  Laterality: Right;      Pre-op Assessment    I have reviewed the Patient Summary Reports.     I have reviewed the Nursing Notes. I have reviewed the NPO Status.   I have reviewed the Medications.     Review of Systems  Hepatic/GI:  Bowel Prep.                Neurological:      Headaches      Dx of Headaches                           Psych:  Psychiatric History                  Physical Exam  General: Well nourished, Cooperative, Alert and Oriented    Airway:  Mallampati: II   Mouth Opening: Normal  TM Distance: Normal  Tongue: Normal  Neck ROM: Normal ROM    Dental:  Intact        Anesthesia Plan  Type of Anesthesia, risks & benefits discussed:    Anesthesia Type: MAC, Gen ETT  Intra-op Monitoring Plan: Standard ASA Monitors  Post Op Pain Control Plan: multimodal analgesia  Induction:  IV  Informed Consent: Informed consent signed with the Patient and all parties understand the risks and agree with anesthesia plan.  All questions answered.   ASA Score: 2  Day of Surgery Review of History & Physical: H&P Update referred to the surgeon/provider.    Ready For Surgery From Anesthesia Perspective.     .

## 2024-10-08 NOTE — ANESTHESIA POSTPROCEDURE EVALUATION
Anesthesia Post Evaluation    Patient: Asya LAGOS    Procedure(s) Performed: Procedure(s) (LRB):  COLONOSCOPY (N/A)    Final Anesthesia Type: MAC      Patient location during evaluation: GI PACU  Patient participation: Yes- Able to Participate  Level of consciousness: awake and alert  Post-procedure vital signs: reviewed and stable  Pain management: adequate  Airway patency: patent    PONV status at discharge: No PONV  Anesthetic complications: no      Cardiovascular status: blood pressure returned to baseline, hemodynamically stable and stable  Respiratory status: unassisted, room air and spontaneous ventilation  Hydration status: euvolemic  Follow-up not needed.              Vitals Value Taken Time   /67 10/08/24 1013   Temp 36.5 °C (97.7 °F) 10/08/24 1013   Pulse 78 10/08/24 1013   Resp 17 10/08/24 1013   SpO2 100 % 10/08/24 1013         No case tracking events are documented in the log.      Pain/Justin Score: Justin Score: 9 (10/8/2024 10:13 AM)

## 2024-10-08 NOTE — PROVATION PATIENT INSTRUCTIONS
Discharge Summary/Instructions after an Endoscopic Procedure  Patient Name: Asya Sun  Patient MRN: [id number]  Patient YOB: 1985 Tuesday, October 8, 2024 Joy Christopher MD  Dear patient,  As a result of recent federal legislation (The Federal Cures Act), you may   receive lab or pathology results from your procedure in your [facility]   account before your physician is able to contact you. Your physician or   their representative will relay the results to you with their   recommendations at their soonest availability.  Thank you,  RESTRICTIONS:  During your procedure today, you received medications for sedation.  These   medications may affect your judgment, balance and coordination.  Therefore,   for 24 hours, you have the following restrictions:   - DO NOT drive a car, operate machinery, make legal/financial decisions,   sign important papers or drink alcohol.    ACTIVITY:  Today: no heavy lifting, straining or running due to procedural   sedation/anesthesia.  The following day: return to full activity including work.  DIET:  Eat and drink normally unless instructed otherwise.     TREATMENT FOR COMMON SIDE EFFECTS:  - Mild abdominal pain, nausea, belching, bloating or excessive gas:  rest,   eat lightly and use a heating pad.  - Sore Throat: treat with throat lozenges and/or gargle with warm salt   water.  - Because air was used during the procedure, expelling large amounts of air   from your rectum or belching is normal.  - If a bowel prep was taken, you may not have a bowel movement for 1-3 days.    This is normal.  SYMPTOMS TO WATCH FOR AND REPORT TO YOUR PHYSICIAN:  1. Abdominal pain or bloating, other than gas cramps.  2. Chest pain.  3. Back pain.  4. Signs of infection such as: chills or fever occurring within 24 hours   after the procedure.  5. Rectal bleeding, which would show as bright red, maroon, or black stools.   (A tablespoon of blood from the rectum is not serious, especially if    hemorrhoids are present.)  6. Vomiting.  7. Weakness or dizziness.  GO DIRECTLY TO THE NEAREST EMERGENCY ROOM IF YOU HAVE ANY OF THE FOLLOWING:      Difficulty breathing              Chills and/or fever over 101 F   Persistent vomiting and/or vomiting blood   Severe abdominal pain   Severe chest pain   Black, tarry stools   Bleeding- more than one tablespoon   Any other symptom or condition that you feel may need urgent attention  Your doctor recommends these additional instructions:  If any biopsies were taken, your doctors clinic will contact you in 1 to 2   weeks with any results.  - Discharge patient to home (ambulatory).   - Patient has a contact number available for emergencies.  The signs and   symptoms of potential delayed complications were discussed with the   patient.  Return to normal activities tomorrow.  Written discharge   instructions were provided to the patient.   - Resume previous diet.   - Continue present medications.   - Return to primary care physician as previously scheduled.   - Repeat colonoscopy in 10 years for screening purposes.  For questions, problems or results please call your physician Joy Christopher MD at Work:  [phone]  If you have any questions about the above instructions, call the GI   department at [phone] or call the endoscopy unit at [phone]   from 7am until 3 pm.  [facility] [place], EMERGENCY ROOM PHONE NUMBER:   [phone]  IF A COMPLICATION OR EMERGENCY SITUATION ARISES AND YOU ARE UNABLE TO REACH   YOUR PHYSICIAN - GO DIRECTLY TO THE EMERGENCY ROOM.  I have read or have had read to me these discharge instructions for my   procedure and have received a written copy.  I understand these   instructions and will follow-up with my physician if I have any questions.     __________________________________       _____________________________________  Nurse Signature                                          Patient/Designated    Responsible Party Signature  Joy Christopher MD  10/8/2024 10:10:12 AM  This report has been verified and signed electronically.  Dear patient,  As a result of recent federal legislation (The Federal Cures Act), you may   receive lab or pathology results from your procedure in your [facility]   account before your physician is able to contact you. Your physician or   their representative will relay the results to you with their   recommendations at their soonest availability.  Thank you,  PROVATION

## 2024-10-08 NOTE — TRANSFER OF CARE
Anesthesia Transfer of Care Note    Patient: Asya LAGOS    Procedure(s) Performed: Procedure(s) (LRB):  COLONOSCOPY (N/A)    Patient location: GI    Anesthesia Type: MAC    Transport from OR: Transported from OR on room air with adequate spontaneous ventilation    Post pain: adequate analgesia    Post assessment: no apparent anesthetic complications and tolerated procedure well    Post vital signs: stable    Level of consciousness: responds to stimulation    Nausea/Vomiting: no nausea/vomiting    Complications: none    Transfer of care protocol was followed      Last vitals: Visit Vitals  /65   Pulse 86   Temp 36.5 °C (97.7 °F)   Resp 20   Wt 60.3 kg (133 lb)   LMP 11/24/2022   SpO2 100%   Breastfeeding No   BMI 22.13 kg/m²

## 2024-10-08 NOTE — PLAN OF CARE
DR JOHNSON AT BEDSIDE TO SPEAK TO PT. REGARDING RESULTS.  VSS, NO GI BLEEDING, NO ABD. PAIN, NO N/V. PT. DISCHARGED FROM UNIT.

## 2024-10-09 VITALS
WEIGHT: 133 LBS | SYSTOLIC BLOOD PRESSURE: 130 MMHG | RESPIRATION RATE: 17 BRPM | BODY MASS INDEX: 22.13 KG/M2 | HEART RATE: 64 BPM | TEMPERATURE: 98 F | OXYGEN SATURATION: 100 % | DIASTOLIC BLOOD PRESSURE: 74 MMHG

## 2024-11-04 ENCOUNTER — OFFICE VISIT (OUTPATIENT)
Dept: DERMATOLOGY | Facility: CLINIC | Age: 39
End: 2024-11-04
Payer: COMMERCIAL

## 2024-11-04 DIAGNOSIS — L70.5 ACNE EXCORIEE: ICD-10-CM

## 2024-11-04 DIAGNOSIS — L70.0 ACNE VULGARIS: Primary | ICD-10-CM

## 2024-11-04 PROCEDURE — G2211 COMPLEX E/M VISIT ADD ON: HCPCS | Mod: 95,,, | Performed by: PHYSICIAN ASSISTANT

## 2024-11-04 PROCEDURE — 1159F MED LIST DOCD IN RCRD: CPT | Mod: CPTII,95,, | Performed by: PHYSICIAN ASSISTANT

## 2024-11-04 PROCEDURE — 1160F RVW MEDS BY RX/DR IN RCRD: CPT | Mod: CPTII,95,, | Performed by: PHYSICIAN ASSISTANT

## 2024-11-04 PROCEDURE — 99214 OFFICE O/P EST MOD 30 MIN: CPT | Mod: 95,,, | Performed by: PHYSICIAN ASSISTANT

## 2024-11-04 RX ORDER — TRETINOIN 0.5 MG/G
CREAM TOPICAL
Qty: 20 G | Refills: 4 | Status: SHIPPED | OUTPATIENT
Start: 2024-11-04

## 2024-11-04 RX ORDER — BENZOYL PEROXIDE 100 MG/ML
LIQUID TOPICAL DAILY
Qty: 227 G | Refills: 12 | Status: SHIPPED | OUTPATIENT
Start: 2024-11-04 | End: 2025-11-04

## 2024-11-04 NOTE — PROGRESS NOTES
Subjective:      Patient ID:  Asya LAGOS is a 39 y.o. female who presents for No chief complaint on file.    The patient location is: [place], [place]  The chief complaint leading to consultation is: acne    Visit type: audiovisual    Face to Face time with patient: 10  15 minutes of total time spent on the encounter, which includes face to face time and non-face to face time preparing to see the patient (eg, review of tests), Obtaining and/or reviewing separately obtained history, Documenting clinical information in the electronic or other health record, Independently interpreting results (not separately reported) and communicating results to the patient/family/caregiver, or Care coordination (not separately reported).         Each patient to whom he or she provides medical services by telemedicine is:  (1) informed of the relationship between the physician and patient and the respective role of any other health care provider with respect to management of the patient; and (2) notified that he or she may decline to receive medical services by telemedicine and may withdraw from such care at any time.    Notes:   Hx of acne, but improved with pregnancy. Believes in last 4 years (post-partum). Flares along chin, jawline, chest and back. +Red pimples, pustules, maybe some cysts.  S/p partial hysterectomy, and endorses cyclic flaring of acne.  Flares seem 1-2 times per month.  Takes a long time to improve, leaves discoloration.    Current tx: otc sal acid washes, pimple patches    Previous tx; retin-a cream           Review of Systems   Constitutional:  Negative for fever and chills.   Gastrointestinal:  Negative for nausea and vomiting.   Skin:  Positive for activity-related sunscreen use. Negative for itching, rash, dry skin, sun sensitivity, daily sunscreen use and recent sunburn.   Hematologic/Lymphatic: Does not bruise/bleed easily.       Objective:   Physical Exam   Constitutional: She appears  well-developed and well-nourished. No distress.   Neurological: She is alert and oriented to person, place, and time. She is not disoriented.   Psychiatric: She has a normal mood and affect.   Skin:   Areas Examined (abnormalities noted in diagram):   Head / Face Inspection Performed  Neck Inspection Performed       Diagram Legend     Erythematous scaling macule/papule c/w actinic keratosis       Vascular papule c/w angioma      Pigmented verrucoid papule/plaque c/w seborrheic keratosis      Yellow umbilicated papule c/w sebaceous hyperplasia      Irregularly shaped tan macule c/w lentigo     1-2 mm smooth white papules consistent with Milia      Movable subcutaneous cyst with punctum c/w epidermal inclusion cyst      Subcutaneous movable cyst c/w pilar cyst      Firm pink to brown papule c/w dermatofibroma      Pedunculated fleshy papule(s) c/w skin tag(s)      Evenly pigmented macule c/w junctional nevus     Mildly variegated pigmented, slightly irregular-bordered macule c/w mildly atypical nevus      Flesh colored to evenly pigmented papule c/w intradermal nevus       Pink pearly papule/plaque c/w basal cell carcinoma      Erythematous hyperkeratotic cursted plaque c/w SCC      Surgical scar with no sign of skin cancer recurrence      Open and closed comedones      Inflammatory papules and pustules      Verrucoid papule consistent consistent with wart     Erythematous eczematous patches and plaques     Dystrophic onycholytic nail with subungual debris c/w onychomycosis     Umbilicated papule    Erythematous-base heme-crusted tan verrucoid plaque consistent with inflamed seborrheic keratosis     Erythematous Silvery Scaling Plaque c/w Psoriasis     See annotation            Assessment / Plan:        Acne vulgaris  Acne excoriee  -     benzoyl peroxide (BP WASH) 10 % external wash; Apply topically once daily. Rinse completely.  May bleach clothing  Dispense: 227 g; Refill: 12  -     tretinoin (RETIN-A) 0.05 %  cream; Apply a pea-sized amount to the entire face at bedtime.  Use every third night and increase as tolerated to nightly.  Dispense: 20 g; Refill: 4  Agree to above. Warned of dryness, irritation.            Follow up in about 3 months (around 2/4/2025) for acne.

## 2024-11-07 ENCOUNTER — PATIENT MESSAGE (OUTPATIENT)
Dept: DERMATOLOGY | Facility: CLINIC | Age: 39
End: 2024-11-07
Payer: COMMERCIAL

## 2025-01-17 ENCOUNTER — OFFICE VISIT (OUTPATIENT)
Dept: GASTROENTEROLOGY | Facility: CLINIC | Age: 40
End: 2025-01-17
Payer: COMMERCIAL

## 2025-01-17 DIAGNOSIS — K59.00 CONSTIPATION, UNSPECIFIED CONSTIPATION TYPE: ICD-10-CM

## 2025-01-17 DIAGNOSIS — K59.04 CHRONIC IDIOPATHIC CONSTIPATION: Primary | ICD-10-CM

## 2025-01-17 DIAGNOSIS — K64.0 GRADE I HEMORRHOIDS: ICD-10-CM

## 2025-01-17 PROCEDURE — 98002 SYNCH AUDIO-VIDEO NEW MOD 45: CPT | Mod: 95,,, | Performed by: INTERNAL MEDICINE

## 2025-01-17 NOTE — PROGRESS NOTES
[facility]  Gastroenterology    Patient evaluated at the request of Joy Christopher MD  [id number] [place] [address] [place] [id number]    PCP: Reanna Keane MD    1/17/25    The patient location is: Home  The chief complaint leading to consultation is: Constipation    Visit type: audiovisual    Face to Face time with patient: 25 minutes of total time spent on the encounter, which includes face to face time and non-face to face time preparing to see the patient (eg, review of tests), Obtaining and/or reviewing separately obtained history, Documenting clinical information in the electronic or other health record, Independently interpreting results (not separately reported) and communicating results to the patient/family/caregiver, or Care coordination (not separately reported).         Each patient to whom he or she provides medical services by telemedicine is:  (1) informed of the relationship between the physician and patient and the respective role of any other health care provider with respect to management of the patient; and (2) notified that he or she may decline to receive medical services by telemedicine and may withdraw from such care at any time.    Notes:       Subjective:   Asya LAGOS is a 39 y.o. female here for evaluation of constipation. States she has had longstanding constipation requiring regular laxative use. Currently uses senna, stool softeners and has tried magnesium which helped some. Has hx of osteogenesis imperfecta. Colonoscopy 10/2024 normal. Can go a week without a BM if not taking any medication. Was on Linzess a long time ago but had to use high doses for it to work. Also was sent to PT for pelvic floor therapy in the past- was told she had some relaxation issues. Benefited a little from the PT, still uses some of the relaxation techniques now. Abdomen can get fullness/discomfort from the retained stool.       Past Medical History:   Diagnosis  Date    Abnormality of hormone     Amenorrhea     Anemia     Constipation     Headache     Osteogenesis imperfecta     PONV (postoperative nausea and vomiting)     Scoliosis        Past Surgical History:   Procedure Laterality Date    BACK SURGERY  2012    initial Graff rods 2006, then revision    CERVICAL BIOPSY  W/ LOOP ELECTRODE EXCISION       SECTION      x1    COLONOSCOPY N/A 10/8/2024    Procedure: COLONOSCOPY;  Surgeon: Joy Christopher MD;  Location: [facility] ENDO;  Service: Colon and Rectal;  Laterality: N/A;    CYSTOSCOPY N/A 2022    Procedure: CYSTOSCOPY;  Surgeon: Jackelin Reinoso MD;  Location: [facility] OR;  Service: OB/GYN;  Laterality: N/A;    DIAGNOSTIC LAPAROSCOPY N/A 2021    Procedure: LAPAROSCOPY, DIAGNOSTIC;  Surgeon: Jackelin Reinoso MD;  Location: [facility] OR;  Service: OB/GYN;  Laterality: N/A;  Evacuation of Hemoperitoneum    FRACTURE SURGERY Right     ankle surgery    HERNIA REPAIR      HYSTERECTOMY      PRE-MALIGNANT / BENIGN SKIN LESION EXCISION Left     abdomen    ROBOT-ASSISTED LAPAROSCOPIC ABDOMINAL HYSTERECTOMY USING DA LUKE XI N/A 2022    Procedure: XI ROBOTIC HYSTERECTOMY;  Surgeon: Jackelin Reinoso MD;  Location: [facility] OR;  Service: OB/GYN;  Laterality: N/A;    ROBOT-ASSISTED LAPAROSCOPIC EXCISION OF CYST OF OVARY Right 2022    Procedure: ROBOTIC CYSTECTOMY, OVARIAN;  Surgeon: Jackelin Reinoso MD;  Location: [facility] OR;  Service: OB/GYN;  Laterality: Right;       Current Outpatient Medications on File Prior to Visit   Medication Sig Dispense Refill    acetaminophen (TYLENOL ORAL) Take 1 tablet by mouth daily as needed.      ASPIRIN ORAL Take 1 tablet by mouth daily as needed.      benzoyl peroxide (BP WASH) 10 % external wash Apply topically once daily. Rinse completely.  May bleach clothing 227 g 12    betahistine HCl (BETAHISTINE, BULK,) 100 % Powd       diltiazem HCl (DILTIAZEM 2% - LIDOCAINE 5% CREAM) Apply 1 application  topically 3 (three) times daily.  30 g 1    hydrocortisone-pramoxine (PROCTOFOAM-HS) rectal foam Place 1 applicator rectally 2 (two) times daily. 10 g 3    metoprolol tartrate (LOPRESSOR) 25 MG tablet Take 25 mg by mouth 2 (two) times daily. Patient takes it as needed      tretinoin (RETIN-A) 0.05 % cream Apply a pea-sized amount to the entire face at bedtime.  Use every third night and increase as tolerated to nightly. 20 g 4    [DISCONTINUED] linaCLOtide (LINZESS) 290 mcg Cap capsule Take 1 capsule (290 mcg total) by mouth before breakfast. 30 capsule 0     No current facility-administered medications on file prior to visit.       Review of patient's allergies indicates:  No Known Allergies    Social History     Socioeconomic History    Marital status:    Tobacco Use    Smoking status: Former     Types: Cigarettes    Smokeless tobacco: Never   Substance and Sexual Activity    Alcohol use: Yes     Comment: occasional    Drug use: No    Sexual activity: Yes     Partners: Male     Birth control/protection: None   Other Topics Concern    Are you pregnant or think you may be? No    Breast-feeding No     Social Drivers of Health     Financial Resource Strain: Low Risk  (6/3/2024)    Overall Financial Resource Strain (CARDIA)     Difficulty of Paying Living Expenses: Not hard at all   Food Insecurity: No Food Insecurity (6/3/2024)    Hunger Vital Sign     Worried About Running Out of Food in the Last Year: Never true     Ran Out of Food in the Last Year: Never true   Transportation Needs: No Transportation Needs (6/3/2024)    TRANSPORTATION NEEDS     Transportation : No   Physical Activity: Insufficiently Active (3/1/2023)    Exercise Vital Sign     Days of Exercise per Week: 1 day     Minutes of Exercise per Session: 30 min   Stress: No Stress Concern Present (3/1/2023)    [employer] [place] [employer] - Occupational Stress Questionnaire     Feeling of Stress : Only a little   Housing Stability: Low Risk  (6/3/2024)    Housing  Stability Vital Sign     Unable to Pay for Housing in the Last Year: No     Homeless in the Last Year: No       Family History   Problem Relation Name Age of Onset    Osteogenesis imperfecta Mother      Stroke Father      Hypertension Father      Hypertension Sister      Osteogenesis imperfecta Brother      Alzheimer's disease Maternal Grandmother      Blindness Maternal Grandfather      Cataracts Maternal Grandfather         Review of Systems   Constitutional:  Negative for appetite change, fever and unexpected weight change.   HENT:  Negative for postnasal drip, rhinorrhea, sneezing, sore throat and trouble swallowing.    Eyes:  Negative for visual disturbance.   Respiratory:  Negative for cough, shortness of breath and wheezing.    Cardiovascular:  Negative for chest pain, palpitations and leg swelling.   Gastrointestinal:  Positive for abdominal pain (discomfort) and constipation. Negative for blood in stool, diarrhea, nausea and vomiting.   Genitourinary:  Negative for dysuria.   Musculoskeletal:  Negative for arthralgias, joint swelling and myalgias.   Skin:  Negative for color change, pallor and rash.   Neurological:  Negative for weakness, light-headedness, numbness and headaches.   Hematological:  Negative for adenopathy. Does not bruise/bleed easily.   Psychiatric/Behavioral:  Negative for agitation.        Objective:   Vitals: There were no vitals filed for this visit.    Physical Exam Unable to perform due to video visit    IMPRESSION     Problem List Items Addressed This Visit          GI    Grade I hemorrhoids    Constipation - Primary    Relevant Medications    linaCLOtide (LINZESS) 290 mcg Cap capsule       PLANS:    - Colonoscopy 10/2024 normal. Recall of 10 years recommended  - Likely combination of slow transit and dyssynergic defecation  - Patient has already tried PT- wants to hold off on going back at this time and on anorectal manometry  - Will do trial of Linzess 290 mcg po daily. Possible  side effects of diarrhea and/or abdominal cramping discussed  - Can add in Miralax PRN to this  - High fiber diet with plenty of fluid intake  - RTC in 2 months for f/u    Chronic idiopathic constipation  -     linaCLOtide (LINZESS) 290 mcg Cap capsule; Take 1 capsule (290 mcg total) by mouth before breakfast.  Dispense: 30 capsule; Refill: 3    Constipation, unspecified constipation type  -     Ambulatory referral/consult to Gastroenterology    Grade I hemorrhoids      Kia Zamora MD  Gastroenterology

## 2025-01-24 ENCOUNTER — PATIENT MESSAGE (OUTPATIENT)
Dept: INTERNAL MEDICINE | Facility: CLINIC | Age: 40
End: 2025-01-24
Payer: COMMERCIAL

## 2025-02-13 ENCOUNTER — PATIENT MESSAGE (OUTPATIENT)
Dept: GASTROENTEROLOGY | Facility: CLINIC | Age: 40
End: 2025-02-13
Payer: COMMERCIAL

## 2025-02-17 RX ORDER — LUBIPROSTONE 24 UG/1
24 CAPSULE ORAL 2 TIMES DAILY WITH MEALS
Qty: 60 CAPSULE | Refills: 3 | Status: SHIPPED | OUTPATIENT
Start: 2025-02-17 | End: 2025-06-17

## 2025-03-14 ENCOUNTER — OFFICE VISIT (OUTPATIENT)
Dept: INTERNAL MEDICINE | Facility: CLINIC | Age: 40
End: 2025-03-14
Payer: COMMERCIAL

## 2025-03-14 ENCOUNTER — LAB VISIT (OUTPATIENT)
Dept: LAB | Facility: HOSPITAL | Age: 40
End: 2025-03-14
Payer: COMMERCIAL

## 2025-03-14 VITALS
RESPIRATION RATE: 16 BRPM | OXYGEN SATURATION: 98 % | TEMPERATURE: 97 F | SYSTOLIC BLOOD PRESSURE: 110 MMHG | HEART RATE: 76 BPM | BODY MASS INDEX: 23.47 KG/M2 | HEIGHT: 65 IN | DIASTOLIC BLOOD PRESSURE: 70 MMHG | WEIGHT: 140.88 LBS

## 2025-03-14 DIAGNOSIS — F41.0 PANIC ATTACKS: ICD-10-CM

## 2025-03-14 DIAGNOSIS — F33.1 MODERATE EPISODE OF RECURRENT MAJOR DEPRESSIVE DISORDER: Primary | ICD-10-CM

## 2025-03-14 DIAGNOSIS — F41.1 GENERALIZED ANXIETY DISORDER: ICD-10-CM

## 2025-03-14 DIAGNOSIS — E34.9 HORMONAL DISORDER: ICD-10-CM

## 2025-03-14 DIAGNOSIS — K21.9 GASTROESOPHAGEAL REFLUX DISEASE, UNSPECIFIED WHETHER ESOPHAGITIS PRESENT: ICD-10-CM

## 2025-03-14 PROCEDURE — 82671 ASSAY OF ESTROGENS: CPT

## 2025-03-14 PROCEDURE — 84403 ASSAY OF TOTAL TESTOSTERONE: CPT

## 2025-03-14 PROCEDURE — 84144 ASSAY OF PROGESTERONE: CPT

## 2025-03-14 PROCEDURE — 99999 PR PBB SHADOW E&M-EST. PATIENT-LVL V: CPT | Mod: PBBFAC,,,

## 2025-03-14 RX ORDER — PANTOPRAZOLE SODIUM 40 MG/1
40 TABLET, DELAYED RELEASE ORAL DAILY
Qty: 30 TABLET | Refills: 0 | Status: SHIPPED | OUTPATIENT
Start: 2025-03-14 | End: 2025-04-13

## 2025-03-14 RX ORDER — HYDROXYZINE HYDROCHLORIDE 25 MG/1
25 TABLET, FILM COATED ORAL EVERY 6 HOURS PRN
COMMUNITY
Start: 2025-03-01 | End: 2025-03-14

## 2025-03-14 RX ORDER — CALCIUM CARBONATE/VITAMIN D3 500-10/5ML
LIQUID (ML) ORAL
COMMUNITY

## 2025-03-14 RX ORDER — BUSPIRONE HYDROCHLORIDE 10 MG/1
10 TABLET ORAL 3 TIMES DAILY
Qty: 90 TABLET | Refills: 0 | Status: SHIPPED | OUTPATIENT
Start: 2025-03-14 | End: 2025-04-13

## 2025-03-14 RX ORDER — METHOCARBAMOL 750 MG/1
750 TABLET, FILM COATED ORAL EVERY 8 HOURS
COMMUNITY
Start: 2024-11-27

## 2025-03-14 RX ORDER — LINACLOTIDE 290 UG/1
290 CAPSULE, GELATIN COATED ORAL
COMMUNITY
Start: 2025-02-21

## 2025-03-14 NOTE — PROGRESS NOTES
Subjective:       Patient ID: Asya LAGOS is a 39 y.o. female.    Chief Complaint: Anxiety (+ Panic Attacks)    History of Present Illness    CHIEF COMPLAINT:  Patient presents today for severe anxiety attacks with associated physical symptoms.    Patient presented to [facility] via ambulance on 02/28 for panic attack and dehydration.  Patient has been drinking at [employer] for the past 2 days.  Patient presented hyperventilating with hand cramps.  Patient was given 1 L of IV fluid, Valium 2.5 mg x 2, Toradol 15 mg.  Chest x-ray and EKG unremarkable.  Patient discharged on hydroxyzine 25 mg every 6 hours,12 tablets given.    ANXIETY:    Today, she reports two episodes of anxiety attacks - first occurring 9 months ago and most recent 2 weeks ago.   She has been feeling anxious and having panic attacks since 02/28.  Current symptoms include nausea with dry heaving, tremors, waves of heat, heart tingling, tingling in arms, jaw and neck tightness, sleep disturbances with breath holding and gasping upon falling asleep, and memory fogginess. She identifies heavy alcohol consumption as a possible trigger for the recent episode but has not drank alcohol or caffeine since then. She has been taking hydroxyzine every six hours for anxiety management but ran out the night before appointment.  She a muscle relaxer today for anxiety.  She wants to know what is causing her anxiety.  She denies recent life or work stressors.    CARDIOVASCULAR:  She has cardiac issues with multiple premature ventricular contractions (PVCs) per day, which have been extremely severe recently. She was prescribed Metoprolol but has not taken it in two weeks due to concerns about interactions with anxiety medication.  She has a cardiology appointment on Monday.    GASTROINTESTINAL:  She reports new onset of mild cough, frequent burping, esophageal tenderness, and burning sensation in throat with spicy foods or beverages. She denies history  "of reflux.    MEDICAL HISTORY:  She has osteogenesis imperfecta. She underwent hysterectomy with ovarian preservation 2 years ago.  She would like her hormones checked today to rule out cause of anxiety.          /70 (BP Location: Left arm, Patient Position: Sitting)   Pulse 76   Temp 97.2 °F (36.2 °C) (Tympanic)   Resp 16   Ht 5' 5" (1.651 m)   Wt 63.9 kg (140 lb 14 oz)   LMP 11/24/2022   SpO2 98%   BMI 23.44 kg/m²     Review of Systems   Constitutional:  Negative for chills and fever.   Eyes:  Negative for visual disturbance.   Respiratory:  Positive for chest tightness and shortness of breath.    Cardiovascular:  Positive for palpitations. Negative for chest pain and leg swelling.   Gastrointestinal:  Positive for nausea. Negative for constipation, diarrhea and vomiting.   Genitourinary:  Negative for difficulty urinating.   Neurological:  Positive for tremors. Negative for headaches.   Psychiatric/Behavioral:  Positive for sleep disturbance. The patient is nervous/anxious.    All other systems reviewed and are negative.      Objective:      Physical Exam  Vitals reviewed.   Constitutional:       General: She is not in acute distress.     Appearance: Normal appearance. She is not ill-appearing or toxic-appearing.   HENT:      Head: Normocephalic and atraumatic.   Eyes:      Pupils: Pupils are equal, round, and reactive to light.   Cardiovascular:      Rate and Rhythm: Normal rate and regular rhythm.   Pulmonary:      Effort: Pulmonary effort is normal.      Breath sounds: Normal breath sounds.   Abdominal:      General: Bowel sounds are normal.      Palpations: Abdomen is soft.   Musculoskeletal:         General: Normal range of motion.      Cervical back: Normal range of motion and neck supple.   Skin:     General: Skin is warm and dry.   Neurological:      General: No focal deficit present.      Mental Status: She is alert and oriented to person, place, and time.   Psychiatric:         Attention " and Perception: Attention and perception normal.         Mood and Affect: Affect normal. Mood is anxious.         Speech: Speech normal.         Behavior: Behavior normal. Behavior is cooperative.         Thought Content: Thought content normal.         Cognition and Memory: Cognition and memory normal.         Judgment: Judgment normal.         Assessment:       1. Moderate episode of recurrent major depressive disorder    2. Generalized anxiety disorder    3. Hormonal disorder    4. Panic attacks    5. Gastroesophageal reflux disease, unspecified whether esophagitis present        Plan:   Moderate episode of recurrent major depressive disorder  -     Ambulatory referral/consult to Psychiatry; Future    Generalized anxiety disorder  -     Ambulatory referral/consult to Psychiatry; Future    Hormonal disorder  -     ESTROGENS, TOTAL; Future  -     PROGESTERONE; Future  -     Testosterone; Future    Panic attacks  -     busPIRone (BUSPAR) 10 MG tablet; Take 1 tablet (10 mg total) by mouth 3 (three) times daily.  -     Ambulatory referral/consult to Psychiatry; Future  -     ESTROGENS, TOTAL; Future  -     PROGESTERONE; Future  -     Testosterone; Future    Gastroesophageal reflux disease, unspecified whether esophagitis present  -     pantoprazole (PROTONIX) 40 MG tablet; Take 1 tablet (40 mg total) by mouth once daily    Follow-up with cardiology on Monday.    Recommended deep breathing, meditation, counseling for anxiety.    Follow up in about 1 month (around 4/14/2025), or if symptoms worsen or fail to improve, for anxiety f/u.

## 2025-03-15 ENCOUNTER — RESULTS FOLLOW-UP (OUTPATIENT)
Dept: INTERNAL MEDICINE | Facility: CLINIC | Age: 40
End: 2025-03-15

## 2025-03-15 LAB
PROGEST SERPL-MCNC: 0.2 NG/ML
TESTOST SERPL-MCNC: 20 NG/DL (ref 5–73)

## 2025-03-21 LAB
ESTRADIOL SERPL HS-MCNC: 38 PG/ML
ESTROGEN SERPL CALC-MCNC: 68 PG/ML
ESTRONE SERPL-MCNC: 30 PG/ML

## 2025-05-23 ENCOUNTER — PATIENT MESSAGE (OUTPATIENT)
Dept: DERMATOLOGY | Facility: CLINIC | Age: 40
End: 2025-05-23
Payer: COMMERCIAL

## 2025-06-03 ENCOUNTER — OFFICE VISIT (OUTPATIENT)
Dept: DERMATOLOGY | Facility: CLINIC | Age: 40
End: 2025-06-03
Payer: COMMERCIAL

## 2025-06-03 DIAGNOSIS — L70.5 ACNE EXCORIEE: ICD-10-CM

## 2025-06-03 DIAGNOSIS — L85.8 KERATOSIS PILARIS RUBRA: ICD-10-CM

## 2025-06-03 DIAGNOSIS — Z87.898 HISTORY OF ATYPICAL NEVUS: ICD-10-CM

## 2025-06-03 DIAGNOSIS — L70.0 ACNE VULGARIS: Primary | ICD-10-CM

## 2025-06-03 DIAGNOSIS — L90.5 SCAR CONDITION AND FIBROSIS OF SKIN: ICD-10-CM

## 2025-06-03 DIAGNOSIS — B07.9 VIRAL WARTS, UNSPECIFIED TYPE: ICD-10-CM

## 2025-06-03 PROCEDURE — 99214 OFFICE O/P EST MOD 30 MIN: CPT | Mod: 25,S$GLB,, | Performed by: PHYSICIAN ASSISTANT

## 2025-06-03 PROCEDURE — 17110 DESTRUCTION B9 LES UP TO 14: CPT | Mod: S$GLB,,, | Performed by: PHYSICIAN ASSISTANT

## 2025-06-03 PROCEDURE — 99999 PR PBB SHADOW E&M-EST. PATIENT-LVL III: CPT | Mod: PBBFAC,,, | Performed by: PHYSICIAN ASSISTANT

## 2025-06-03 PROCEDURE — 1160F RVW MEDS BY RX/DR IN RCRD: CPT | Mod: CPTII,S$GLB,, | Performed by: PHYSICIAN ASSISTANT

## 2025-06-03 PROCEDURE — 1159F MED LIST DOCD IN RCRD: CPT | Mod: CPTII,S$GLB,, | Performed by: PHYSICIAN ASSISTANT

## 2025-06-03 RX ORDER — TRETINOIN 0.5 MG/G
CREAM TOPICAL
Qty: 20 G | Refills: 4 | Status: SHIPPED | OUTPATIENT
Start: 2025-06-03

## 2025-07-11 ENCOUNTER — PATIENT MESSAGE (OUTPATIENT)
Dept: DERMATOLOGY | Facility: CLINIC | Age: 40
End: 2025-07-11
Payer: COMMERCIAL

## 2025-08-24 ENCOUNTER — PATIENT MESSAGE (OUTPATIENT)
Dept: SURGERY | Facility: CLINIC | Age: 40
End: 2025-08-24
Payer: COMMERCIAL